# Patient Record
Sex: MALE
[De-identification: names, ages, dates, MRNs, and addresses within clinical notes are randomized per-mention and may not be internally consistent; named-entity substitution may affect disease eponyms.]

---

## 2019-01-31 ENCOUNTER — HOSPITAL (OUTPATIENT)
Dept: OTHER | Age: 24
End: 2019-01-31
Attending: SURGERY

## 2024-02-26 ENCOUNTER — APPOINTMENT (OUTPATIENT)
Dept: GENERAL RADIOLOGY | Facility: HOSPITAL | Age: 29
End: 2024-02-26
Attending: EMERGENCY MEDICINE
Payer: COMMERCIAL

## 2024-02-26 ENCOUNTER — HOSPITAL ENCOUNTER (EMERGENCY)
Facility: HOSPITAL | Age: 29
Discharge: HOME | End: 2024-02-26
Attending: EMERGENCY MEDICINE
Payer: COMMERCIAL

## 2024-02-26 VITALS
HEIGHT: 70 IN | WEIGHT: 170 LBS | BODY MASS INDEX: 24.34 KG/M2 | TEMPERATURE: 98 F | RESPIRATION RATE: 16 BRPM | DIASTOLIC BLOOD PRESSURE: 66 MMHG | HEART RATE: 60 BPM | SYSTOLIC BLOOD PRESSURE: 133 MMHG | OXYGEN SATURATION: 97 %

## 2024-02-26 DIAGNOSIS — J35.8 TONSILLAR CALCULUS: ICD-10-CM

## 2024-02-26 DIAGNOSIS — T18.108A FOREIGN BODY IN ESOPHAGUS, INITIAL ENCOUNTER: Primary | ICD-10-CM

## 2024-02-26 PROCEDURE — 99284 EMERGENCY DEPT VISIT MOD MDM: CPT

## 2024-02-26 PROCEDURE — 99283 EMERGENCY DEPT VISIT LOW MDM: CPT

## 2024-02-26 PROCEDURE — 70360 X-RAY EXAM OF NECK: CPT | Performed by: EMERGENCY MEDICINE

## 2024-02-26 RX ORDER — PANTOPRAZOLE SODIUM 40 MG/1
40 TABLET, DELAYED RELEASE ORAL DAILY
Qty: 30 TABLET | Refills: 0 | Status: SHIPPED | OUTPATIENT
Start: 2024-02-26 | End: 2024-03-27

## 2024-02-26 RX ORDER — DIPHENHYDRAMINE HYDROCHLORIDE AND LIDOCAINE HYDROCHLORIDE AND ALUMINUM HYDROXIDE AND MAGNESIUM HYDRO
10 KIT ONCE
Status: COMPLETED | OUTPATIENT
Start: 2024-02-26 | End: 2024-02-26

## 2024-02-26 NOTE — ED PROVIDER NOTES
Patient Seen in: [facility] Emergency Department    History     Chief Complaint   Patient presents with    FB in Throat     Stated Complaint: FB in throat    HPI    Patient here with concern he has a chicken bone stuck in throat.  No difficulty breathing or swallowing.      History reviewed. No pertinent past medical history.    History reviewed. No pertinent surgical history.         No family history on file.    Social History     Socioeconomic History    Marital status: Single   Tobacco Use    Smoking status: Every Day     Packs/day: .2     Types: Cigarettes       Review of Systems    Positive for stated complaint: FB in throat  Other systems are as noted in HPI.  Constitutional and vital signs reviewed.      All other systems reviewed and negative except as noted above.    PSFH elements reviewed from today and agreed except as otherwise stated in HPI.    Physical Exam     ED Triage Vitals   BP 02/26/24 1625 130/73   Pulse 02/26/24 1624 72   Resp 02/26/24 1624 16   Temp 02/26/24 1624 98.3 °F (36.8 °C)   Temp src 02/26/24 1624 Temporal   SpO2 02/26/24 1624 97 %   O2 Device 02/26/24 1624 None (Room air)       Current:/73   Pulse 72   Temp 98.3 °F (36.8 °C) (Temporal)   Resp 16   Ht 177.8 cm (5' 10\")   Wt 77.1 kg   SpO2 97%   BMI 24.39 kg/m²       GENERAL: awake alert  HEAD: normocephalic, atraumatic  EYES: sclera non icteric bilateral, conjunctiva clear  EARS:TM's clear bilateral  THROAT: l tonsilar crypt calculus noted and removed with ear lioop, used laryngoscope no fb noted, post pharynx injected, uvula midline, no pointing, no stridor  LUNGS:  no resp distress, lungs clear bilateral  SKIN: good skin turgor, no obvious rashes  NECK: supple, no meningeal signs, no lymphadenopathy  CARDIO: Regular without murmur  EXTREMITIES: no cyanosis, clubbing or edema      DDX: fb vs. Esophageal injury with fb sensation    ED Course   Labs Reviewed - No data to display  XR SOFT TISSUE NECK  (CPT=70360)    Result Date: 2/26/2024  CONCLUSION:  1. Soft tissue airway.  No radiopaque foreign body identified.  Normal epiglottis. 2. Small bilateral cervical ribs at C7.    Dictated by (CST): Pedro Bartlett MD on 2/26/2024 at 6:06 PM     Finalized by (CST): Pedro Bartlett MD on 2/26/2024 at 6:06 PM           I reviewed xray noted no f.b.  MDM     Medical Decision Making  Problems Addressed:  Foreign body in esophagus, initial encounter: acute illness or injury  Tonsillar calculus: acute illness or injury    Amount and/or Complexity of Data Reviewed  Radiology: ordered and independent interpretation performed. Decision-making details documented in ED Course.  Discussion of management or test interpretation with external provider(s): Maalox.    Risk  OTC drugs.  Prescription drug management.          Disposition and Plan     Clinical Impression:  1. Foreign body in esophagus, initial encounter    2. Tonsillar calculus        Disposition:  Discharge    Follow-up:  Jigar Meng MD  [address] [place] [id number]  [phone]    Follow up        Medications Prescribed:  Current Discharge Medication List        START taking these medications    Details   pantoprazole 40 MG Oral Tab EC Take 1 tablet (40 mg total) by mouth daily.  Qty: 30 tablet, Refills: 0

## 2024-02-26 NOTE — ED INITIAL ASSESSMENT (HPI)
Pt arrives ambulatory to ED for FB in throat. Pt states a few days ago he was eating chicken and feels a chicken bone has been lodged in his throat ever since. Pt states difficulty swallowing, denies excess drooling. Good respiratory effort, even and unlabored. Aox4, speaking in full sentences.

## 2024-03-01 ENCOUNTER — TELEPHONE (OUTPATIENT)
Facility: CLINIC | Age: 29
End: 2024-03-01

## 2024-03-01 NOTE — TELEPHONE ENCOUNTER
LMTCB    Pt never been seen in our clinic.  Yusra first available is in August.    Can offer first available with NP or PA